# Patient Record
Sex: MALE | HISPANIC OR LATINO | Employment: FULL TIME | URBAN - METROPOLITAN AREA
[De-identification: names, ages, dates, MRNs, and addresses within clinical notes are randomized per-mention and may not be internally consistent; named-entity substitution may affect disease eponyms.]

---

## 2023-09-18 ENCOUNTER — HOSPITAL ENCOUNTER (EMERGENCY)
Facility: CLINIC | Age: 43
Discharge: HOME | End: 2023-09-18
Attending: PHYSICIAN ASSISTANT | Admitting: PHYSICIAN ASSISTANT

## 2023-09-18 ENCOUNTER — APPOINTMENT (OUTPATIENT)
Dept: GENERAL RADIOLOGY | Facility: CLINIC | Age: 43
End: 2023-09-18
Attending: PHYSICIAN ASSISTANT

## 2023-09-18 VITALS
RESPIRATION RATE: 16 BRPM | OXYGEN SATURATION: 99 % | DIASTOLIC BLOOD PRESSURE: 92 MMHG | TEMPERATURE: 98.1 F | SYSTOLIC BLOOD PRESSURE: 135 MMHG | HEART RATE: 92 BPM

## 2023-09-18 DIAGNOSIS — S90.01XA CONTUSION OF RIGHT ANKLE, INITIAL ENCOUNTER: ICD-10-CM

## 2023-09-18 PROCEDURE — 99283 EMERGENCY DEPT VISIT LOW MDM: CPT

## 2023-09-18 PROCEDURE — 73610 X-RAY EXAM OF ANKLE: CPT | Mod: RT

## 2023-09-18 PROCEDURE — 73630 X-RAY EXAM OF FOOT: CPT | Mod: RT

## 2023-09-18 RX ORDER — LIDOCAINE 50 MG/G
OINTMENT TOPICAL 3 TIMES DAILY PRN
Qty: 30 G | Refills: 0 | Status: SHIPPED | OUTPATIENT
Start: 2023-09-18

## 2023-09-18 ASSESSMENT — ACTIVITIES OF DAILY LIVING (ADL): ADLS_ACUITY_SCORE: 33

## 2023-09-18 NOTE — ED TRIAGE NOTES
Patient presents to the ED reporting right ankle pain. Patient states he dropped a piece of iron on the ankle on Thursday and has had increasing pain since.

## 2023-09-18 NOTE — ED PROVIDER NOTES
History     Chief Complaint:  Ankle Pain     The history is provided by a relative. The history is limited by a language barrier. A  was used ([facility]).      Missy Donato is a 43 year old male with no past pertinent medical history who presents to the emergency department for ankle pain. The patient's relative states that on Thursday, sheet rock fell on the patient's right ankle. He has been experiencing increasing pain in his right ankle since the incident. He reports that he took ibuprofen and Tylenol today for his pain. He adds that the patient is able to move his toes no numbness. He notes that the patient is unable to ambulate due to pain and has been laying down and using crutches since the incident. Denies any bleeding or laceration from the incident. Denies any right upper leg pain.    Independent Historian:   The patient's relative provided the history as noted above.    Review of External Notes:   None    Medications:    The patient is currently on no regular medications.    Past Medical History:    No past medical history on file.    Past Surgical History:    No past surgical history on file.     Physical Exam   Patient Vitals for the past 24 hrs:   BP Temp Pulse Resp SpO2   09/18/23 1651 (!) 135/92 98.1  F (36.7  C) 92 16 99 %      Physical Exam  General: Alert and oriented.    Head: Normocephalic. External ears and nose normal.    Eyes: Pupils equal and round.  Normal tracking.    Pulmonary/Chest: Effort and rate normal.    MSK:  Mild bruising to medial and lateral ankle w/ttp over medial and lateral malleolus.  Ranging ankle fully.  Mild bony ttp over proximal forefoot w/out bruising or swelling.  No calcaneal/plantar ttp.  Ranging MTP, IP joiints normally. No focal lateral talar tenderness. No tenderness over shaft or proximal tibia/fibula and normal ROM in knee without pain.       SKIN:  Warm and dry with strong DP or posterior tibial pulses w/cap refill <2 seconds.   No bruising or swelling over plantar surface of foot.    NEURO:  Normal strength and sensation throughout the foot and toes.     PSYCH:  Normal affect    Emergency Department Course   Imaging:  Foot  XR, G/E 3 views, right   Final Result   IMPRESSION: There is no acute fracture the right foot or ankle. Ankle mortise is intact. No sizable fixation.      Ankle XR, G/E 3 views, right   Final Result   IMPRESSION: There is no acute fracture the right foot or ankle. Ankle mortise is intact. No sizable fixation.         Report per radiology    Emergency Department Course & Assessments:    Interventions:  None     Assessments:   I obtained history and examined the patient as noted above.    I discussed findings and discharge with the patient. All questions answered. I ace wrapped his right ankle.    Independent Interpretation (X-rays, CTs, rhythm strip):  I independently reviewed the patient's right ankle X-ray and foot x-ray.  I note no evidence of fracture or dislocation.    Consultations/Discussion of Management or Tests:  None     Social Determinants of Health affecting care:   None    Disposition:  The patient was discharged to home.     Impression & Plan      Medical Decision Makin-year-old male presents after a heavy piece of sheet rock fell on his ankle foot a few days ago.  X-rays negative for fracture or dislocation.  There is nothing to suggest occult fracture.  CMS intact no evidence of compartment syndrome.  No laceration or skin breakage.  Nothing to suggest Lisfranc injury and this would be exceedingly unlikely based on the mechanism and exam.  My impression is contusion.  Discussed price close follow-up with PCP or Ortho for repeat x-rays in 5 to 7 days if not improving.  Return for new or worsening symptoms etc.    Diagnosis:    ICD-10-CM    1. Contusion of right ankle, initial encounter  S90.01XA            Discharge Medications:  New Prescriptions    LIDOCAINE (XYLOCAINE) 5 % EXTERNAL  OINTMENT    Apply topically 3 times daily as needed for moderate pain      Scribe Disclosure:  I, Alex Mijares, am serving as a scribe at 5:13 PM on 9/18/2023 to document services personally performed by Adan Poole PA-C based on my observations and the provider's statements to me.     9/18/2023   Adan Poole PA-C Etten, Clark Ellsworth, PA-C  09/18/23 1833